# Patient Record
Sex: FEMALE | Race: WHITE | NOT HISPANIC OR LATINO | URBAN - NONMETROPOLITAN AREA
[De-identification: names, ages, dates, MRNs, and addresses within clinical notes are randomized per-mention and may not be internally consistent; named-entity substitution may affect disease eponyms.]

---

## 2018-10-10 ENCOUNTER — TELEPHONE (OUTPATIENT)
Dept: URGENT CARE | Facility: CLINIC | Age: 23
End: 2018-10-10

## 2018-10-10 NOTE — TELEPHONE ENCOUNTER
Letter returned NSN, that was sent to patient with lab results and instructions for follow up. Re-mailed letter to patients address on license.

## 2019-09-27 ENCOUNTER — PROCEDURE VISIT (OUTPATIENT)
Dept: OBSTETRICS AND GYNECOLOGY | Facility: CLINIC | Age: 24
End: 2019-09-27

## 2019-09-27 VITALS
HEIGHT: 64 IN | SYSTOLIC BLOOD PRESSURE: 106 MMHG | WEIGHT: 136.8 LBS | BODY MASS INDEX: 23.35 KG/M2 | DIASTOLIC BLOOD PRESSURE: 72 MMHG

## 2019-09-27 DIAGNOSIS — Z01.419 ENCOUNTER FOR GYNECOLOGICAL EXAMINATION WITHOUT ABNORMAL FINDING: Primary | ICD-10-CM

## 2019-09-27 DIAGNOSIS — Z11.3 SCREENING FOR STD (SEXUALLY TRANSMITTED DISEASE): ICD-10-CM

## 2019-09-27 DIAGNOSIS — Z12.4 SCREENING FOR CERVICAL CANCER: ICD-10-CM

## 2019-09-27 DIAGNOSIS — Z30.011 ENCOUNTER FOR INITIAL PRESCRIPTION OF CONTRACEPTIVE PILLS: ICD-10-CM

## 2019-09-27 PROCEDURE — 99385 PREV VISIT NEW AGE 18-39: CPT | Performed by: PHYSICIAN ASSISTANT

## 2019-09-27 RX ORDER — DROSPIRENONE AND ETHINYL ESTRADIOL 0.02-3(28)
1 KIT ORAL DAILY
Qty: 28 TABLET | Refills: 12 | Status: SHIPPED | OUTPATIENT
Start: 2019-09-27

## 2019-09-27 NOTE — PATIENT INSTRUCTIONS
Encourage self breast exam monthly  Condoms always  GC CT screening done with pap  Resume oc's with next cycle and reminded to take oc's consistently

## 2019-09-27 NOTE — PROGRESS NOTES
"Subjective   Chief Complaint   Patient presents with   • Gynecologic Exam     Last pap done -WNL   • Contraception     Would like to discuss birth control options       Joelle Fatima is a 23 y.o. year old new patient  presenting to be seen for her annual gynecological exam.   She is desiring to resume oc's. Stopped Sprintec 4 months ago as was causing emotional ups and downs. Wants to try different oc  She is sexually active and using condoms  Her periods are regular and LMP started 19 but reports bleeding very light today  She would like option of STI screening with pap     Past Medical History:   Diagnosis Date   • Urinary tract infection         Current Outpatient Medications:   •  drospirenone-ethinyl estradiol (GIANVI) 3-0.02 MG per tablet, Take 1 tablet by mouth Daily., Disp: 28 tablet, Rfl: 12   No Known Allergies   History reviewed. No pertinent surgical history.   Social History     Socioeconomic History   • Marital status: Single     Spouse name: Not on file   • Number of children: Not on file   • Years of education: Not on file   • Highest education level: Not on file   Tobacco Use   • Smoking status: Never Smoker   • Smokeless tobacco: Never Used   Substance and Sexual Activity   • Alcohol use: Yes   • Drug use: No   • Sexual activity: Yes     Partners: Male     Birth control/protection: Condom      Family History   Problem Relation Age of Onset   • No Known Problems Mother    • No Known Problems Father        Review of Systems   Constitutional: Negative.    HENT: Positive for sinus pressure.    Gastrointestinal: Negative.    Genitourinary: Negative for difficulty urinating, dysuria, menstrual problem, pelvic pain and vaginal discharge.   All other systems reviewed and are negative.          Objective   /72   Ht 162.6 cm (64\")   Wt 62.1 kg (136 lb 12.8 oz)   LMP 2019 (Exact Date)   Breastfeeding? No   BMI 23.48 kg/m²     Physical Exam   Constitutional: She appears " well-developed and well-nourished. She is cooperative.   Eyes: Conjunctivae, EOM and lids are normal.   Pulmonary/Chest: Right breast exhibits no inverted nipple, no mass, no nipple discharge, no skin change and no tenderness. Left breast exhibits no inverted nipple, no mass, no nipple discharge, no skin change and no tenderness.   Abdominal: Soft. Normal appearance. There is no tenderness. There is no rigidity and no guarding.   Genitourinary: Uterus normal. There is no tenderness or lesion on the right labia. There is no tenderness or lesion on the left labia. Cervix exhibits no motion tenderness. Right adnexum displays no mass and no tenderness. Left adnexum displays no mass and no tenderness. There is bleeding in the vagina.   Genitourinary Comments: Scant menses   Pap done   Neurological: She is alert.   Skin: Skin is warm and dry.   Psychiatric: She has a normal mood and affect. Her behavior is normal. Thought content normal.            Assessment and Plan  Joelle was seen today for gynecologic exam and contraception.    Diagnoses and all orders for this visit:    Encounter for gynecological examination without abnormal finding    Screening for cervical cancer    Screening for STD (sexually transmitted disease)  -     Liquid-based Pap Smear, Screening; Future    Encounter for initial prescription of contraceptive pills    Other orders  -     drospirenone-ethinyl estradiol (GIANVI) 3-0.02 MG per tablet; Take 1 tablet by mouth Daily.      Patient Instructions   Encourage self breast exam monthly  Condoms always  GC CT screening done with pap  Resume oc's with next cycle and reminded to take oc's consistently                This note was electronically signed.    Court Hall PA-C   September 27, 2019

## 2019-10-09 DIAGNOSIS — Z11.3 SCREENING FOR STD (SEXUALLY TRANSMITTED DISEASE): ICD-10-CM
